# Patient Record
Sex: MALE | Race: WHITE | NOT HISPANIC OR LATINO | Employment: STUDENT | URBAN - METROPOLITAN AREA
[De-identification: names, ages, dates, MRNs, and addresses within clinical notes are randomized per-mention and may not be internally consistent; named-entity substitution may affect disease eponyms.]

---
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
## 2024-07-15 ENCOUNTER — HOSPITAL ENCOUNTER (EMERGENCY)
Facility: HOSPITAL | Age: 19
Discharge: HOME | End: 2024-07-15
Attending: EMERGENCY MEDICINE
Payer: COMMERCIAL

## 2024-07-15 VITALS
HEIGHT: 70 IN | BODY MASS INDEX: 22.19 KG/M2 | OXYGEN SATURATION: 100 % | SYSTOLIC BLOOD PRESSURE: 138 MMHG | TEMPERATURE: 97.9 F | WEIGHT: 155 LBS | HEART RATE: 60 BPM | DIASTOLIC BLOOD PRESSURE: 78 MMHG | RESPIRATION RATE: 18 BRPM

## 2024-07-15 DIAGNOSIS — Z23 NEED FOR TETANUS BOOSTER: ICD-10-CM

## 2024-07-15 DIAGNOSIS — S81.819A LEG LACERATION: Primary | ICD-10-CM

## 2024-07-15 PROCEDURE — 99284 EMERGENCY DEPT VISIT MOD MDM: CPT | Performed by: EMERGENCY MEDICINE

## 2024-07-15 PROCEDURE — 99283 EMERGENCY DEPT VISIT LOW MDM: CPT

## 2024-07-15 PROCEDURE — 90715 TDAP VACCINE 7 YRS/> IM: CPT | Performed by: EMERGENCY MEDICINE

## 2024-07-15 PROCEDURE — 90471 IMMUNIZATION ADMIN: CPT

## 2024-07-15 PROCEDURE — 12002 RPR S/N/AX/GEN/TRNK2.6-7.5CM: CPT | Performed by: EMERGENCY MEDICINE

## 2024-07-15 RX ADMIN — TETANUS TOXOID, REDUCED DIPHTHERIA TOXOID AND ACELLULAR PERTUSSIS VACCINE, ADSORBED 0.5 ML: 5; 2.5; 8; 8; 2.5 SUSPENSION INTRAMUSCULAR at 22:59

## 2024-07-15 NOTE — Clinical Note
Александр Pierson was seen and treated in our emergency department on 7/15/2024.    No restrictions            Diagnosis:     Александр  .    He may return on this date: 07/17/2024         If you have any questions or concerns, please don't hesitate to call.      Yony Garcia, DO    ______________________________           _______________          _______________  Hospital Representative                              Date                                Time

## 2024-07-16 NOTE — ED PROVIDER NOTES
History  Chief Complaint   Patient presents with    Extremity Laceration     Right shin laceration; slipped at the pool and cut leg; happened around 1400     19-year-old male no significant past medical history presents with right lower extremity laceration.          None       History reviewed. No pertinent past medical history.    Past Surgical History:   Procedure Laterality Date    HERNIA REPAIR         History reviewed. No pertinent family history.  I have reviewed and agree with the history as documented.    E-Cigarette/Vaping    E-Cigarette Use Current Every Day User      E-Cigarette/Vaping Substances    Nicotine Yes     THC Yes     CBD No     Flavoring No     Other No     Unknown No      Social History     Tobacco Use    Smoking status: Never    Smokeless tobacco: Never   Vaping Use    Vaping status: Every Day    Substances: Nicotine, THC   Substance Use Topics    Alcohol use: Yes     Comment: 1 mixed drink a night       Review of Systems    Physical Exam  Physical Exam  Vitals and nursing note reviewed.   Constitutional:       General: He is not in acute distress.     Appearance: Normal appearance.   HENT:      Head: Normocephalic and atraumatic.      Nose: Nose normal.   Eyes:      General:         Right eye: No discharge.         Left eye: No discharge.   Cardiovascular:      Rate and Rhythm: Normal rate and regular rhythm.   Pulmonary:      Effort: Pulmonary effort is normal. No respiratory distress.   Abdominal:      General: Abdomen is flat. There is no distension.   Musculoskeletal:         General: No deformity. Normal range of motion.   Skin:     General: Skin is warm.      Findings: No rash.      Comments: 3 cm laceration linear on the right lower extremity   Neurological:      Mental Status: He is alert and oriented to person, place, and time.      Motor: No weakness.   Psychiatric:         Mood and Affect: Mood normal.         Behavior: Behavior normal.         Vital Signs  ED Triage Vitals  "[07/15/24 2227]   Temperature Pulse Respirations Blood Pressure SpO2   97.9 °F (36.6 °C) 60 18 138/78 100 %      Temp src Heart Rate Source Patient Position - Orthostatic VS BP Location FiO2 (%)   -- -- -- -- --      Pain Score       No Pain           Vitals:    07/15/24 2227   BP: 138/78   Pulse: 60         Visual Acuity      ED Medications  Medications   tetanus-diphtheria-acellular pertussis (BOOSTRIX) IM injection 0.5 mL (0.5 mL Intramuscular Given 7/15/24 4329)       Diagnostic Studies  Results Reviewed       None                   No orders to display              Procedures  Universal Protocol:  Risks and benefits: risks, benefits and alternatives were discussed  Consent given by: patient  Time out: Immediately prior to procedure a \"time out\" was called to verify the correct patient, procedure, equipment, support staff and site/side marked as required.  Patient identity confirmed: verbally with patient  Laceration repair    Date/Time: 7/15/2024 11:13 PM    Performed by: Yony Garcia DO  Authorized by: Yony Garcia DO  Laceration length: 3 cm    Wound Dehiscence:  Superficial Wound Dehiscence: simple closure      Procedure Details:  Preparation: Patient was prepped and draped in the usual sterile fashion.  Irrigation solution: saline  Skin closure: glue  Approximation difficulty: simple  Patient tolerance: patient tolerated the procedure well with no immediate complications               ED Course                                               Medical Decision Making  Isolated injury to the right lower extremity with a superficial laceration.  No bony tenderness no surrounding erythema and bleeding controlled.  Examined in a bloodless field no foreign body appreciated.  Chart reviewed, last tetanus out of date, will provide tetanus vaccination.  Wound cleansed with sterile saline and Betadine was applied due to the slight delayed presentation.  Still a candidate for surgical glue.  Wound was " repaired with surgical glue and covered in bandage.  Discussed return precautions for cellulitis.    Problems Addressed:  Leg laceration: acute illness or injury  Need for tetanus booster: acute illness or injury    Risk  Prescription drug management.                 Disposition  Final diagnoses:   Leg laceration   Need for tetanus booster     Time reflects when diagnosis was documented in both MDM as applicable and the Disposition within this note       Time User Action Codes Description Comment    7/15/2024 10:50 PM Yony Garcia [S81.819A] Leg laceration     7/15/2024 10:51 PM Yony Garcia [Z23] Need for tetanus booster           ED Disposition       ED Disposition   Discharge    Condition   Stable    Date/Time   Mon Jul 15, 2024 10:50 PM    Comment   Александр Pierson discharge to home/self care.                   Follow-up Information       Follow up With Specialties Details Why Contact Info    Chris Sellers DO Pediatrics  As needed [address] [place] [address]  [phone]              Patient's Medications    No medications on file       No discharge procedures on file.    PDMP Review       None            ED Provider  Electronically Signed by             Yony Garcia DO  07/15/24 9063

## 2024-07-26 ENCOUNTER — OFFICE VISIT (OUTPATIENT)
Dept: URGENT CARE | Facility: MEDICAL CENTER | Age: 19
End: 2024-07-26
Payer: COMMERCIAL

## 2024-07-26 ENCOUNTER — APPOINTMENT (OUTPATIENT)
Dept: RADIOLOGY | Facility: MEDICAL CENTER | Age: 19
End: 2024-07-26
Payer: COMMERCIAL

## 2024-07-26 VITALS
HEIGHT: 70 IN | RESPIRATION RATE: 16 BRPM | OXYGEN SATURATION: 100 % | BODY MASS INDEX: 22.62 KG/M2 | HEART RATE: 57 BPM | DIASTOLIC BLOOD PRESSURE: 72 MMHG | SYSTOLIC BLOOD PRESSURE: 125 MMHG | TEMPERATURE: 98 F | WEIGHT: 158 LBS

## 2024-07-26 DIAGNOSIS — S81.811A LACERATION OF RIGHT LOWER LEG WITH INFECTION, INITIAL ENCOUNTER: Primary | ICD-10-CM

## 2024-07-26 DIAGNOSIS — L08.9 LACERATION OF RIGHT LOWER LEG WITH INFECTION, INITIAL ENCOUNTER: Primary | ICD-10-CM

## 2024-07-26 DIAGNOSIS — S89.91XA INJURY OF RIGHT SHIN, INITIAL ENCOUNTER: ICD-10-CM

## 2024-07-26 PROCEDURE — 99283 EMERGENCY DEPT VISIT LOW MDM: CPT

## 2024-07-26 PROCEDURE — 73590 X-RAY EXAM OF LOWER LEG: CPT

## 2024-07-26 PROCEDURE — G0382 LEV 3 HOSP TYPE B ED VISIT: HCPCS

## 2024-07-26 RX ORDER — CEPHALEXIN 500 MG/1
500 CAPSULE ORAL EVERY 8 HOURS SCHEDULED
Qty: 21 CAPSULE | Refills: 0 | Status: SHIPPED | OUTPATIENT
Start: 2024-07-26 | End: 2024-08-02

## 2024-07-26 NOTE — PATIENT INSTRUCTIONS
Your finalized x-ray results will be available on [facility] when read by the radiologist.  Your increase in pain may be due to possible infection of the wound. Prescribed course of Keflex, take as directed.  Recommend rest, ice, elevation. Over the counter pain medication as directed on packaging as needed for pain (ex: Tylenol, ibuprofen).    Follow up with PCP in 3-5 days.  Proceed to  ER if symptoms worsen.    If tests are performed, our office will contact you with results only if changes need to made to the care plan discussed with you at the visit. You can review your full results on [facility].    Patient Education     Laceration Infection   About this topic   A laceration is a cut or tear in your body on your skin, muscle, or tissue. It can occur anywhere there is soft tissue. The doctor cleaned out your cut and then closed it with one of these ways:  Special skin glue that holds the wound edges together  Strips of special adhesive tape, called steri-strips  Use a special type of thread called stitches to close some wounds. Some stitches need to be taken out after the wound heals. Others melt away or dissolve as the wound heals.  Special metal staples  A combination of these methods  You may develop an infection even though your cut was cleaned. The skin around your cut may be red, raised, and hurt when you touch it. Pus may develop deep in the infection. The doctor may need to drain the pus.  What are the causes?   Germs may cause an infection in your cut.  What can make this more likely to happen?   You are more likely to get an infection if you are older or have diabetes. People who have a problem with their immune system or who have been in the hospital are also more likely to have an infection.  What are the main signs?   The area around the cut is red, warm and sore to the touch. You may have a fever and there may be pus coming from the wound.  How does the doctor diagnose this health problem?    The doctor will look at your wound and may take out any stitches that are in place. The doctor will clean the wound and may put special packing inside the cut. To learn about how serious the infection is, the doctor may order:  Lab tests  X-rays  Ultrasound  How does the doctor treat this health problem?   You may need to have dressing changes 1 to 2 times each day as your wound heals. The doctor may order drugs and procedures to treat or prevent infection. Your doctor may give you a tetanus shot if needed.  What drugs may be needed?   The doctor may order drugs to:  Help with pain  Prevent or fight an infection  What problems could happen?   Infection of the bloodstream, other body organs, nearby tissues, or bone  Last Reviewed Date   2021-05-05  Consumer Information Use and Disclaimer   This generalized information is a limited summary of diagnosis, treatment, and/or medication information. It is not meant to be comprehensive and should be used as a tool to help the user understand and/or assess potential diagnostic and treatment options. It does NOT include all information about conditions, treatments, medications, side effects, or risks that may apply to a specific patient. It is not intended to be medical advice or a substitute for the medical advice, diagnosis, or treatment of a health care provider based on the health care provider's examination and assessment of a patient’s specific and unique circumstances. Patients must speak with a health care provider for complete information about their health, medical questions, and treatment options, including any risks or benefits regarding use of medications. This information does not endorse any treatments or medications as safe, effective, or approved for treating a specific patient. [employer] and its affiliates disclaim any warranty or liability relating to this information or the use thereof. The use of this information is governed by the Terms of Use,  available at https://www.woltersShopcadeuwer.com/en/know/clinical-effectiveness-terms   Copyright   Copyright © 2024 [employer] and its affiliates and/or licensors. All rights reserved.

## 2024-07-26 NOTE — LETTER
July 26, 2024     Patient: Александр Pierson   YOB: 2005   Date of Visit: 7/26/2024       To Whom it May Concern:    Александр Pierson was seen in my clinic on 7/26/2024. He may return to work on 7/27/24 pr earlier if his symptoms improve .    If you have any questions or concerns, please don't hesitate to call.         Sincerely,          Ivonne Espinosa PA-C        CC: No Recipients

## 2024-07-26 NOTE — PROGRESS NOTES
[facility]        NAME: Александр Pierson is a 19 y.o. male  : 2005    MRN: [id number]  DATE: 2024  TIME: 12:31 PM    Assessment and Plan   Laceration of right lower leg with infection, initial encounter [S81.811A, L08.9]  1. Laceration of right lower leg with infection, initial encounter  cephalexin (KEFLEX) 500 mg capsule      2. Injury of right shin, initial encounter  XR tibia fibula 2 vw right        Right tib fib x-ray: No acute osseous abnormality per preliminary read. Radiology to determine final read.    Prescribed course of Keflex for possible wound infection. Advised symptomatic treatment, PCP follow up.    Patient Instructions     Your finalized x-ray results will be available on [facility] when read by the radiologist.  Your increase in pain may be due to possible infection of the wound. Prescribed course of Keflex, take as directed.  Recommend rest, ice, elevation. Over the counter pain medication as directed on packaging as needed for pain (ex: Tylenol, ibuprofen).    Follow up with PCP in 3-5 days.  Proceed to  ER if symptoms worsen.    If tests are performed, our office will contact you with results only if changes need to made to the care plan discussed with you at the visit. You can review your full results on [facility].    Chief Complaint     Chief Complaint   Patient presents with    Shin Pain     Patient was seen at emergency room 11 days ago for laceration on R shin that occurred when he was getting out of a pool at [facility]. States this morning he woke up and was unable to put any weight on his R leg and adds that the wound has been warm to the touch. Denies any other issues/symptoms since ER visit on 7/15.         History of Present Illness       Patient presents with an injury to his right shin that occurred on 7/15/24. He was [facility] when he hit his right shin on the side of the pool. He was initially at [place] ED for this injury, the  "laceration was glued. No x-rays were performed at that time. The pain overall has been unchanged. Today he woke up and states he was \"unable to put weight on his right leg,\" but ambulated into the [facility] on his own. He also notes the area was swollen when he woke up. He would rate the pain currently as a 5/10, this morning when putting weight on the leg it was an 8/10. Notes small amount of redness around the wound. Denies numbness, tingling, bruising, fever, chills, muscle aches. He has been washing the wound daily in the shower, otherwise has not been doing anything specific for the wound. He states he took some Advil around 0830, which helped enough that he could walk on it. He notes the Advil seems to be wearing off.        Review of Systems   Review of Systems   Constitutional:  Negative for chills and fever.   Musculoskeletal:  Positive for arthralgias, gait problem and joint swelling. Negative for myalgias.   Skin:  Positive for color change and wound.         Current Medications       Current Outpatient Medications:     cephalexin (KEFLEX) 500 mg capsule, Take 1 capsule (500 mg total) by mouth every 8 (eight) hours for 7 days, Disp: 21 capsule, Rfl: 0    Current Allergies     Allergies as of 07/26/2024 - Reviewed 07/26/2024   Allergen Reaction Noted    Penicillins Hives 01/25/2018            The following portions of the patient's history were reviewed and updated as appropriate: allergies, current medications, past family history, past medical history, past social history, past surgical history and problem list.     No past medical history on file.    Past Surgical History:   Procedure Laterality Date    HERNIA REPAIR         No family history on file.      Medications have been verified.        Objective   /72   Pulse 57   Temp 98 °F (36.7 °C)   Resp 16   Ht 5' 10\" (1.778 m)   Wt 71.7 kg (158 lb)   SpO2 100%   BMI 22.67 kg/m²        Physical Exam     Physical Exam  Vitals and nursing note " reviewed.   Constitutional:       General: He is not in acute distress.     Appearance: Normal appearance. He is not ill-appearing.   Cardiovascular:      Rate and Rhythm: Normal rate and regular rhythm.      Pulses: Normal pulses.      Heart sounds: Normal heart sounds.   Pulmonary:      Effort: Pulmonary effort is normal.      Breath sounds: Normal breath sounds.   Musculoskeletal:      Right lower leg: Swelling (surorunding wound), laceration, tenderness and bony tenderness present. No deformity.      Comments: Right shin with 3 cm laceration with Steri-Strips intact, small amount of surrounding erythema, swelling, tenderness. Gait intact, not antalgic.   Neurological:      Mental Status: He is alert.

## 2024-10-09 ENCOUNTER — OFFICE VISIT (OUTPATIENT)
Dept: URGENT CARE | Facility: MEDICAL CENTER | Age: 19
End: 2024-10-09
Payer: COMMERCIAL

## 2024-10-09 VITALS
DIASTOLIC BLOOD PRESSURE: 74 MMHG | WEIGHT: 155.8 LBS | HEART RATE: 84 BPM | SYSTOLIC BLOOD PRESSURE: 115 MMHG | RESPIRATION RATE: 18 BRPM | BODY MASS INDEX: 23.08 KG/M2 | OXYGEN SATURATION: 98 % | TEMPERATURE: 97.3 F | HEIGHT: 69 IN

## 2024-10-09 DIAGNOSIS — B34.9 VIRAL ILLNESS: Primary | ICD-10-CM

## 2024-10-09 PROCEDURE — 99283 EMERGENCY DEPT VISIT LOW MDM: CPT | Performed by: PHYSICIAN ASSISTANT

## 2024-10-09 PROCEDURE — G0382 LEV 3 HOSP TYPE B ED VISIT: HCPCS | Performed by: PHYSICIAN ASSISTANT

## 2024-10-09 RX ORDER — DIPHENOXYLATE HYDROCHLORIDE AND ATROPINE SULFATE 2.5; .025 MG/1; MG/1
1 TABLET ORAL EVERY MORNING
COMMUNITY

## 2024-10-09 NOTE — LETTER
October 9, 2024     Patient: Александр Pierson  YOB: 2005  Date of Visit: 10/9/2024      To Whom it May Concern:    Александр Pierson is under my professional care. Александр was seen in my office on 10/9/2024.     If you have any questions or concerns, please don't hesitate to call.         Sincerely,          Belkis Odell PA-C        CC: No Recipients

## 2024-10-09 NOTE — PROGRESS NOTES
[facility]      NAME: Александр Pierson is a 19 y.o. male  : 2005    MRN: [id number]  DATE: 2024  TIME: 3:05 PM    Assessment and Plan   Viral illness [B34.9]  1. Viral illness            Patient Instructions   Infection appears viral.  Recommend symptomatic treatment.  Can take ibuprofen or tylenol as needed for pain or fever.  Over the counter cough and cold medications to help with symptoms.  Use salt water gargles for sore throat and throat lozenges.  Cough drops as needed.  Wash hands frequently to prevent the spread of infection.  Symptoms may persist for 10-14 days.  Risks and benefits discussed. Patient understands and agrees with the plan.      If tests have been performed at [facility], our office will contact you with results if changes need to be made to the care plan discussed with you at the visit.  You can review your full results on [facility] MyChart.     Follow up with PCP in 3-5 days.      If any of the following occur, please report to your nearest ED for evaluation or call 911.   Difficultly breathing or shortness of breath  Chest pain  Acutely worsening symptoms.   To present to the ER if symptoms worsen.  Chief Complaint     Chief Complaint   Patient presents with    Cough     Pt states he has had a nonproductive cough since Saturday that has been getting worse.  Fever for only 2 days.  He continues to have body aches and inflamed joints.          History of Present Illness   Александр Pierson presents to the clinic c/o    Cough  This is a new problem. The current episode started in the past 7 days (10/5). The problem has been unchanged. The problem occurs every few minutes. The cough is Productive of sputum. Associated symptoms include a fever, headaches, myalgias and nasal congestion. Pertinent negatives include no chest pain, chills, ear pain, eye redness, rash, sore throat, shortness of breath or wheezing. Treatments tried: dayquil/nyquil. His past medical history is  significant for pneumonia.   - at home covid test    Review of Systems   Review of Systems   Constitutional:  Positive for appetite change, fatigue and fever. Negative for chills and diaphoresis.   HENT:  Positive for congestion. Negative for ear discharge, ear pain, facial swelling, sinus pressure, sinus pain and sore throat.    Eyes:  Negative for photophobia, pain, discharge, redness, itching and visual disturbance.   Respiratory:  Positive for cough. Negative for apnea, chest tightness, shortness of breath and wheezing.    Cardiovascular:  Negative for chest pain and palpitations.   Gastrointestinal:  Positive for diarrhea. Negative for abdominal pain, nausea and vomiting.   Musculoskeletal:  Positive for myalgias.   Skin:  Negative for color change, rash and wound.   Neurological:  Positive for headaches. Negative for dizziness.   Hematological:  Negative for adenopathy.         Current Medications     Long-Term Medications   Medication Sig Dispense Refill    multivitamin (THERAGRAN) TABS Take 1 tablet by mouth every morning         Current Allergies     Allergies as of 10/09/2024 - Reviewed 10/09/2024   Allergen Reaction Noted    Penicillins Hives 01/25/2018            The following portions of the patient's history were reviewed and updated as appropriate: allergies, current medications, past family history, past medical history, past social history, past surgical history and problem list.  History reviewed. No pertinent past medical history.  Past Surgical History:   Procedure Laterality Date    HERNIA REPAIR       Social History     Socioeconomic History    Marital status: Single     Spouse name: Not on file    Number of children: Not on file    Years of education: Not on file    Highest education level: Not on file   Occupational History    Not on file   Tobacco Use    Smoking status: Never    Smokeless tobacco: Never   Vaping Use    Vaping status: Every Day    Substances: Nicotine, THC   Substance and  "Sexual Activity    Alcohol use: Yes     Comment: 1 mixed drink a night    Drug use: Not on file    Sexual activity: Not on file   Other Topics Concern    Not on file   Social History Narrative    Not on file     Social Determinants of Health     Financial Resource Strain: Not on file   Food Insecurity: Not on file   Transportation Needs: Not on file   Physical Activity: Not on file   Stress: Not on file   Social Connections: Not on file   Intimate Partner Violence: Not on file   Housing Stability: Not on file       Objective   /74 (BP Location: Left arm, Patient Position: Sitting)   Pulse 84   Temp (!) 97.3 °F (36.3 °C) (Tympanic)   Resp 18   Ht 5' 9\" (1.753 m)   Wt 70.7 kg (155 lb 12.8 oz)   SpO2 98%   BMI 23.01 kg/m²      Physical Exam     Physical Exam  Vitals and nursing note reviewed.   Constitutional:       General: He is not in acute distress.     Appearance: He is well-developed. He is not diaphoretic.   HENT:      Head: Normocephalic and atraumatic.      Right Ear: Tympanic membrane and external ear normal.      Left Ear: Tympanic membrane and external ear normal.      Nose: Nose normal.      Mouth/Throat:      Mouth: Mucous membranes are moist.      Pharynx: No oropharyngeal exudate or posterior oropharyngeal erythema.   Eyes:      General: No scleral icterus.        Right eye: No discharge.         Left eye: No discharge.      Conjunctiva/sclera: Conjunctivae normal.   Cardiovascular:      Rate and Rhythm: Normal rate and regular rhythm.      Heart sounds: Normal heart sounds. No murmur heard.     No friction rub. No gallop.   Pulmonary:      Effort: Pulmonary effort is normal. No respiratory distress.      Breath sounds: Normal breath sounds. No decreased breath sounds, wheezing, rhonchi or rales.   Abdominal:      General: Bowel sounds are normal. There is no distension.      Palpations: Abdomen is soft.      Tenderness: There is no abdominal tenderness. There is no right CVA tenderness, " left CVA tenderness, guarding or rebound.   Skin:     General: Skin is warm and dry.      Coloration: Skin is not pale.      Findings: No erythema or rash.   Neurological:      Mental Status: He is alert and oriented to person, place, and time.   Psychiatric:         Behavior: Behavior normal.         Thought Content: Thought content normal.         Judgment: Judgment normal.         Belkis Odell PA-C